# Patient Record
Sex: MALE | Race: WHITE
[De-identification: names, ages, dates, MRNs, and addresses within clinical notes are randomized per-mention and may not be internally consistent; named-entity substitution may affect disease eponyms.]

---

## 2020-12-04 ENCOUNTER — HOSPITAL ENCOUNTER (OUTPATIENT)
Age: 30
End: 2020-12-04
Payer: MEDICARE

## 2020-12-04 DIAGNOSIS — R53.83: Primary | ICD-10-CM

## 2020-12-04 DIAGNOSIS — R63.1: ICD-10-CM

## 2020-12-04 LAB
ALANINE AMINOTRANSFER ALT/SGPT: 32 U/L (ref 16–61)
ALBUMIN SERPL-MCNC: 4 G/DL (ref 3.2–5)
ALKALINE PHOSPHATASE: 87 U/L (ref 45–117)
ANION GAP: 6 (ref 5–15)
AST(SGOT): 18 U/L (ref 15–37)
BUN SERPL-MCNC: 9 MG/DL (ref 7–18)
BUN/CREAT RATIO: 11 RATIO (ref 10–20)
CALCIUM SERPL-MCNC: 9.2 MG/DL (ref 8.5–10.1)
CARBON DIOXIDE: 28 MMOL/L (ref 21–32)
CHLORIDE: 105 MMOL/L (ref 98–107)
DEPRECATED RDW RBC: 41.4 FL (ref 35.1–43.9)
ERYTHROCYTE [DISTWIDTH] IN BLOOD: 12.5 % (ref 11.6–14.6)
EST GLOM FILT RATE - AFR AMER: 144 ML/MIN (ref 60–?)
GLOBULIN: 4 G/DL (ref 2.2–4.2)
GLUCOSE: 82 MG/DL (ref 74–106)
HCT VFR BLD AUTO: 47.8 % (ref 40–54)
HEMOGLOBIN: 15.7 G/DL (ref 13–16.5)
HGB BLD-MCNC: 15.7 G/DL (ref 13–16.5)
IMMATURE GRANULOCYTES COUNT: 0.03 X10^3/UL (ref 0–0)
MCV RBC: 89.5 FL (ref 80–94)
MEAN CORP HGB CONC: 32.8 G/DL (ref 32–36)
MEAN PLATELET VOL.: 9.6 FL (ref 6.2–12)
NRBC FLAGGED BY ANALYZER: 0 % (ref 0–5)
PLATELET # BLD: 230 K/MM3 (ref 150–450)
PLATELET COUNT: 230 K/MM3 (ref 150–450)
POTASSIUM: 3.8 MMOL/L (ref 3.5–5.1)
RBC # BLD AUTO: 5.34 M/MM3 (ref 4.6–6.2)
RBC DISTRIBUTION WIDTH CV: 12.5 % (ref 11.6–14.6)
RBC DISTRIBUTION WIDTH SD: 41.4 FL (ref 35.1–43.9)
T3 SERPL IA-MCNC: 1.03 NG/ML (ref 0.6–1.81)
T4 SERPL-MCNC: 7.1 UG/DL (ref 4.5–12.1)
WBC # BLD AUTO: 7.8 K/MM3 (ref 4.4–11)
WHITE BLOOD COUNT: 7.8 K/MM3 (ref 4.4–11)

## 2020-12-04 PROCEDURE — 84480 ASSAY TRIIODOTHYRONINE (T3): CPT

## 2020-12-04 PROCEDURE — 84443 ASSAY THYROID STIM HORMONE: CPT

## 2020-12-04 PROCEDURE — 85025 COMPLETE CBC W/AUTO DIFF WBC: CPT

## 2020-12-04 PROCEDURE — 36415 COLL VENOUS BLD VENIPUNCTURE: CPT

## 2020-12-04 PROCEDURE — 84436 ASSAY OF TOTAL THYROXINE: CPT

## 2020-12-04 PROCEDURE — 80053 COMPREHEN METABOLIC PANEL: CPT

## 2023-11-30 ENCOUNTER — APPOINTMENT (OUTPATIENT)
Dept: DERMATOLOGY | Facility: CLINIC | Age: 33
End: 2023-11-30
Payer: MEDICARE

## 2023-12-13 ENCOUNTER — TELEMEDICINE (OUTPATIENT)
Dept: BEHAVIORAL HEALTH | Facility: CLINIC | Age: 33
End: 2023-12-13
Payer: MEDICARE

## 2023-12-13 DIAGNOSIS — F41.1 GAD (GENERALIZED ANXIETY DISORDER): ICD-10-CM

## 2023-12-13 DIAGNOSIS — F32.A CHRONIC DEPRESSION: ICD-10-CM

## 2023-12-13 DIAGNOSIS — F42.9 OBSESSIVE-COMPULSIVE DISORDER, UNSPECIFIED TYPE: ICD-10-CM

## 2023-12-13 PROCEDURE — 99214 OFFICE O/P EST MOD 30 MIN: CPT | Performed by: CLINICAL NURSE SPECIALIST

## 2023-12-13 RX ORDER — SERTRALINE HYDROCHLORIDE 100 MG/1
150 TABLET, FILM COATED ORAL DAILY
COMMUNITY
End: 2023-12-13 | Stop reason: SDUPTHER

## 2023-12-13 RX ORDER — BUPROPION HYDROCHLORIDE 150 MG/1
150 TABLET ORAL
Qty: 90 TABLET | Refills: 0 | Status: SHIPPED | OUTPATIENT
Start: 2023-12-13 | End: 2024-02-01 | Stop reason: SDUPTHER

## 2023-12-13 RX ORDER — BUPROPION HYDROCHLORIDE 150 MG/1
150 TABLET ORAL
COMMUNITY
Start: 2023-05-18 | End: 2023-12-13 | Stop reason: SDUPTHER

## 2023-12-13 RX ORDER — SERTRALINE HYDROCHLORIDE 100 MG/1
150 TABLET, FILM COATED ORAL DAILY
Qty: 135 TABLET | Refills: 0 | Status: SHIPPED | OUTPATIENT
Start: 2023-12-13 | End: 2024-02-01 | Stop reason: SDUPTHER

## 2023-12-13 NOTE — PROGRESS NOTES
Outpatient Psychiatry      Subjective   Dante Jones, a 33 y.o. male, presents for follow up for medication appointment /outpatient psychiatry appointment as a virtual appointment , he is an established patient   He did the appointment with his mother with his preference for this , whom helps with his care with managing appointments , and he makes his own medical decisions , and she appears supportive     Diagnosis:          ANISH (generalized anxiety disorder) (300.02) (F41.1)   · Chronic depression (311) (F32.A)   · OCD (obsessive compulsive disorder) (300.3) (F42.9)    Treatment Goals:  Specify outcomes written in observable, behavioral terms:   Anxiety: reducing negative automatic thoughts and reducing physical symptoms of anxiety  Depression: increasing energy, increasing motivation, reducing fatigue, and reducing negative automatic thoughts    Treatment Plan/Recommendations: can call  for treatment concerns.can continue to work on self care and wellness efforts and diversion activities and continue therapy as planned. can continue routine health screenings. can follow up for meds in 10-12 weeks   Follow-up plan was discussed with patient.    Review with patient: Treatment plan reviewed with the patient.  Medication risks/benefit reviewed with the patient    HPI:  encouraged maintaining a schedule daily with diversion activities and exercise , self care and maintaining a regular sleep pattern   he has continued to see his therapist regularly   intends to continue therapy   discussed the process for seeking emergency treatment for mental health crisis situations   reconciled medication list in the [facility] emr and provided psychoeducation   support and psychoeducation provided       he has gone to group therapy      He is active with his [employer]       He says he has gained weight , says he gained about 25 pounds      Says he had another sleep apnea test last year and says in April he had a sleep study and  "it is \" not significant \" he had a cpap at one point     and he was not able to use this       He saw a functional medicine dr at [facility] and and he feels validated that his health concerns are being addressed there       He has not had any self injury since previous appointment      He has intrusive thoughts at time of negative self talk  , denies having any suicidal thoughts     He is taking sertraline in the evening     Review of systems :    Depressive Symptoms: chronic depressed /irritable mood, fatigue, poor concentration, but no loss of interest, no change in appetite, no recent says in 2014 he was 240 and now is at 260 pounds , craves chocolate lb weight gain, no recent lb weight loss, no insomnia, not feeling worthless or guilty, ability to make decisions, no suicidal ideation, no guns or weapons in household.   Manic Symptoms: mood is not irritable or elevated, self esteem is not grandiose or increased, no changes in need for sleep, not more talkative than usual, does not have flight of ideas or racing thoughts, no distractibility, no psychomotor agitation or increased goal-directed activity, no excessive involvement in pleasurable activities.   Psychotic Symptoms: no hallucinations, no delusions, no disorganized speech, does not have disorganized behavior or catatonia, no negative symptoms.   Anxiety Symptoms: difficulty controlling worry, increased arousal, obsessions (recurrent thoughts, impulses, or images), compulsions , exposure to traumatic event, but no panic attacks, no concerns about future panic attacks, no worry about panic attack consequences, no change in behavior due to panic attacks, no excessive worry, not easily fatigued due to worry, no difficulty concentrating due to worry, no irritability due to worry, no muscle tension due to worry, no sleep disturbances due to worry, no specific phobia, no social phobia, no re-experiencing of traumatic event, no avoidance of stimuli and " number of responsiveness, no restlessness / feeling on edge due to worry . ruminates , in ocd like manner on symptoms , then looks up side effects and medications , also engages his mother in looking up these things.   Delirium/ Altered Mental Status Symptoms: no disturbances of consciousness, no diminished ability to focus, sustain, shift attention, no change in cognition or perceptual disturbances, symptoms do not fluctuate during the course of the day, general medical condition is not present.   Disordered Eating Symptoms: weight is not less than 85% of ideal body weight, no intense fear of gaining weight, does not have a poor body image, no restricting of diet and/or excessive exercise, no purging or laxative use.   Post-traumatic stress disorder symptoms The patient is currently asymptomatic.   Inattentive Symptoms: often has difficulty paying attention, is often disorganized, is often easily distracted, often avoids/dislikes tasks with sustained mental effort, but does not make careless mistakes often, does not lose things often, is not often forgetful, listens when spoken to directly, is able to follow instructions and finish schoolwork.   Conduct Issues: no aggression towards people or animals, no destruction of property, no deceitfulness, does not violate rules.   Other Symptoms/ Concerns: no symptoms of separation anxiety, no reactive attachment symptoms, no motor tics, no vocal tics, no stuttering, no phonological problems, no loss of urine control, no encopresis, no intellectual disability, no self-injurious behaviors, not somatic and no conversion symptoms, no gender identity symptoms, no sleep disorder symptoms, no impulse control symptoms, no personality disorder symptoms.       Constitutional: as noted in HPI, no sleep apnea, normal sleeping, no night wakings, no snoring, not a picky eater, normal appetite, no swallowing problems, no night terrors, no nightmares, no restless sleep, no snorts/gasps  and no obesity.   Eyes: no vision test, no vision impairment, does not wear glasses/contacts, does not wear glassess/contacts and no blindness.   ENT: no hearing tested, no hearing loss, no hearing aid, no cochlear implant, no excessive drooling, no dental problems and no recurrent strep throat.   Cardiovascular: no murmur, no heart defect, no chest pain, no palpitations and no syncope.   Respiratory: no wheezing and no asthma/reactive airway disease.   Gastrointestinal: no constipation, no abdominal pain, no nausea, no vomiting, no diarrhea, no blood in stools, no g-tube and no reflux.   Genitourinary: no nocturnal enuresis, no diurnal enuresis and no incontinence.   Musculoskeletal: normal gait and no torticollis, but moving all extremities well and symmetrical, no arthritis or joint problems, no myalgias, no muscle weakness and normal hand preference.   Integumentary: no changes in moles or birthmarks, no rashes and no atopic dermatitis.   Neurological: no symmetrical facies, no headache, no head injury, no seizures, no staring spells, no loss of consciousness, no meningitis/encephalitis, no cerebral palsy, no spina bifida, no stereotypy, no developmental regression and no tics or twitches.   Endocrine: no temperature intolerance, , good growth and no failure to thrive.   Hematologic/Lymphatic: no anemia and no lead poisoning.        Current Medications:  No current outpatient medications on file.  Medical History:  No past medical history on file.  Surgical History:  No past surgical history on file.  Family History:  No family history on file.  Social History:  Social History     Socioeconomic History    Marital status: Single     Spouse name: Not on file    Number of children: Not on file    Years of education: Not on file    Highest education level: Not on file   Occupational History    Not on file   Tobacco Use    Smoking status: Not on file    Smokeless tobacco: Not on file   Substance and Sexual Activity     Alcohol use: Not on file    Drug use: Not on file    Sexual activity: Not on file   Other Topics Concern    Not on file   Social History Narrative    Not on file     Social Determinants of Health     Financial Resource Strain: Not on file   Food Insecurity: Not on file   Transportation Needs: Not on file   Physical Activity: Not on file   Stress: Not on file   Social Connections: Not on file   Intimate Partner Violence: Not on file   Housing Stability: Not on file     Record Review: brief     Vitals:  There were no vitals filed for this visit.    Laura Byers, APRN-CNS

## 2024-02-01 ENCOUNTER — TELEPHONE (OUTPATIENT)
Dept: BEHAVIORAL HEALTH | Facility: CLINIC | Age: 34
End: 2024-02-01

## 2024-02-01 ENCOUNTER — TELEMEDICINE (OUTPATIENT)
Dept: BEHAVIORAL HEALTH | Facility: CLINIC | Age: 34
End: 2024-02-01
Payer: MEDICARE

## 2024-02-01 DIAGNOSIS — F32.A CHRONIC DEPRESSION: ICD-10-CM

## 2024-02-01 DIAGNOSIS — F41.1 GAD (GENERALIZED ANXIETY DISORDER): ICD-10-CM

## 2024-02-01 DIAGNOSIS — F42.9 OBSESSIVE-COMPULSIVE DISORDER, UNSPECIFIED TYPE: ICD-10-CM

## 2024-02-01 PROCEDURE — 99214 OFFICE O/P EST MOD 30 MIN: CPT | Performed by: CLINICAL NURSE SPECIALIST

## 2024-02-01 RX ORDER — BUPROPION HYDROCHLORIDE 150 MG/1
150 TABLET ORAL
Qty: 90 TABLET | Refills: 0 | Status: SHIPPED | OUTPATIENT
Start: 2024-02-01 | End: 2024-04-03 | Stop reason: SDUPTHER

## 2024-02-01 RX ORDER — SERTRALINE HYDROCHLORIDE 100 MG/1
150 TABLET, FILM COATED ORAL DAILY
Qty: 135 TABLET | Refills: 0 | Status: SHIPPED | OUTPATIENT
Start: 2024-02-01 | End: 2024-04-03 | Stop reason: SDUPTHER

## 2024-02-01 NOTE — PROGRESS NOTES
"Outpatient Psychiatry      Subjective   Dante Jones, a 34 y.o. male, presents for follow up for medication appointment /outpatient psychiatry appointment as a virtual appointment , he is an established patient   He did the appointment with his mother with his preference for this , whom helps with his care with managing appointments , and he makes his own medical decisions , and she appears supportive        Diagnosis: ANISH (generalized anxiety disorder) (300.02) (F41.1)   · Chronic depression (311) (F32.A)   · OCD (obsessive compulsive disorder) (300.3) (F42.9)    Treatment Plan/Recommendations: can call  for treatment concerns.can continue to work on self care and wellness efforts and diversion activities and continue therapy as planned. can continue routine health screenings. can follow up for meds in 10-12 weeks    Follow-up plan was discussed with patient.    Review with patient: Treatment plan reviewed with the patient.  Medication risks/benefit reviewed with the patient    HPI:  encouraged maintaining a schedule daily with diversion activities and exercise , self care and maintaining a regular sleep pattern   he has continued to see his therapist regularly   intends to continue therapy   discussed the process for seeking emergency treatment for mental health crisis situations   reconciled medication list in the [facility] emr and provided psychoeducation   support and psychoeducation provided       he has gone to group therapy    Says he had another sleep apnea test last year and says in April he had a sleep study and it is \" not significant \" he had a cpap at one point     and he was not able to use this       He saw a functional medicine dr at [facility] and and he feels validated that his health concerns are being addressed there       He has not had any self injury recently      He has intrusive thoughts at time of negative self talk  , denies having any suicidal thoughts     He is taking " sertraline in the evening      Current Outpatient Medications:     buPROPion XL (Wellbutrin XL) 150 mg 24 hr tablet, Take 1 tablet (150 mg) by mouth once daily in the morning. Take before meals., Disp: 90 tablet, Rfl: 0    sertraline (Zoloft) 100 mg tablet, Take 1.5 tablets (150 mg) by mouth once daily., Disp: 135 tablet, Rfl: 0  Medical History:  No past medical history on file.  Surgical History:  No past surgical history on file.  Family History:  No family history on file.  Social History:  Social History     Socioeconomic History    Marital status: Single     Spouse name: Not on file    Number of children: Not on file    Years of education: Not on file    Highest education level: Not on file   Occupational History    Not on file   Tobacco Use    Smoking status: Not on file    Smokeless tobacco: Not on file   Substance and Sexual Activity    Alcohol use: Not on file    Drug use: Not on file    Sexual activity: Not on file   Other Topics Concern    Not on file   Social History Narrative    Not on file     Social Determinants of Health     Financial Resource Strain: Not on file   Food Insecurity: Not on file   Transportation Needs: Not on file   Physical Activity: Not on file   Stress: Not on file   Social Connections: Not on file   Intimate Partner Violence: Not on file   Housing Stability: Not on file     Record Review: brief     Vitals:  There were no vitals filed for this visit.    Laura Byers, STU-CNS

## 2024-04-03 ENCOUNTER — TELEMEDICINE (OUTPATIENT)
Dept: BEHAVIORAL HEALTH | Facility: CLINIC | Age: 34
End: 2024-04-03
Payer: MEDICARE

## 2024-04-03 DIAGNOSIS — F41.1 GAD (GENERALIZED ANXIETY DISORDER): ICD-10-CM

## 2024-04-03 DIAGNOSIS — F42.9 OBSESSIVE-COMPULSIVE DISORDER, UNSPECIFIED TYPE: ICD-10-CM

## 2024-04-03 DIAGNOSIS — F32.A CHRONIC DEPRESSION: ICD-10-CM

## 2024-04-03 PROCEDURE — 99214 OFFICE O/P EST MOD 30 MIN: CPT | Performed by: CLINICAL NURSE SPECIALIST

## 2024-04-03 RX ORDER — BUPROPION HYDROCHLORIDE 150 MG/1
150 TABLET ORAL
Qty: 90 TABLET | Refills: 0 | Status: SHIPPED | OUTPATIENT
Start: 2024-04-03 | End: 2024-07-02

## 2024-04-03 RX ORDER — SUMATRIPTAN 50 MG/1
TABLET, FILM COATED ORAL
COMMUNITY
Start: 2022-05-27

## 2024-04-03 RX ORDER — PREDNISOLONE ACETATE 10 MG/ML
SUSPENSION/ DROPS OPHTHALMIC
COMMUNITY
Start: 2022-10-18

## 2024-04-03 RX ORDER — MULTIVITAMIN
1 TABLET ORAL
COMMUNITY

## 2024-04-03 RX ORDER — MULTIVIT-MIN/FERROUS FUMARATE 15 MG
TABLET ORAL
COMMUNITY
Start: 2020-05-14

## 2024-04-03 RX ORDER — CICLOPIROX 80 MG/ML
SOLUTION TOPICAL
COMMUNITY
Start: 2024-03-12

## 2024-04-03 RX ORDER — SERTRALINE HYDROCHLORIDE 100 MG/1
150 TABLET, FILM COATED ORAL DAILY
Qty: 135 TABLET | Refills: 0 | Status: SHIPPED | OUTPATIENT
Start: 2024-04-03 | End: 2024-07-02

## 2024-04-03 RX ORDER — LEVOCETIRIZINE DIHYDROCHLORIDE 5 MG/1
5 TABLET, FILM COATED ORAL
COMMUNITY
Start: 2024-01-16

## 2024-04-03 NOTE — PROGRESS NOTES
"Outpatient Psychiatry      Subjective     Dante Jones, a 34 y.o. male,presents for follow up for medication appointment /outpatient psychiatry appointment as a virtual appointment , he is an established patient   He did the appointment with his mother with his preference for this , whom helps with his care with managing appointments , and he makes his own medical decisions , and she appears supportive         Diagnosis: ANISH (generalized anxiety disorder) (300.02) (F41.1)   · Chronic depression (311) (F32.A)   · OCD (obsessive compulsive disorder) (300.3) (F42.9)    Treatment Plan/Recommendations:   Follow-up plan was discussed with patient.can follow up in 10-12 weeks , can call  for treatment and scheduling concerns         Review with patient: Treatment plan reviewed with the patient.  Medication risks/benefit reviewed with the patient      HPI:  Sleeps through the night , falls asleep in 5 minutes to a half an hour or more, he does not have any chronic issues with sleep   he has continued to see his psychologist regularly   intends to continue therapy   discussed the process for seeking emergency treatment for mental health crisis situations   reconciled medication list in the [facility] emr and provided psychoeducation   support and psychoeducation provided       he has gone to group therapy    Says he had another sleep apnea test last year and says in April he had a sleep study and it is \" not significant \" he had a cpap at one point and he was not able to use this       He saw a functional medicine dr at [facility] and and he feels validated that his health concerns are being addressed there       He has not had any self injury recently      He has intrusive thoughts at time of negative self talk  , denies having any suicidal thoughts     He is taking sertraline in the evening, he sees the sertraline as helpful for intrusive thoughts  Says sometimes he feels tired \" when I get brain fog \" which " "he says is relived when he moves his right shoulder , he was focused on talking about what he sees as a connection with this   His mother says \" when the brain fog is under control he is better \"      Current Outpatient Medications:     buPROPion XL (Wellbutrin XL) 150 mg 24 hr tablet, Take 1 tablet (150 mg) by mouth once daily in the morning. Take before meals., Disp: 90 tablet, Rfl: 0    sertraline (Zoloft) 100 mg tablet, Take 1.5 tablets (150 mg) by mouth once daily., Disp: 135 tablet, Rfl: 0  Medical History:  No past medical history on file.  Surgical History:  No past surgical history on file.  Family History:  No family history on file.  Social History:  Social History     Socioeconomic History    Marital status: Single     Spouse name: Not on file    Number of children: Not on file    Years of education: Not on file    Highest education level: Not on file   Occupational History    Not on file   Tobacco Use    Smoking status: Not on file    Smokeless tobacco: Not on file   Substance and Sexual Activity    Alcohol use: Not on file    Drug use: Not on file    Sexual activity: Not on file   Other Topics Concern    Not on file   Social History Narrative    Not on file     Social Determinants of Health     Financial Resource Strain: Not on file   Food Insecurity: Not on file   Transportation Needs: Not on file   Physical Activity: Not on file   Stress: Not on file   Social Connections: Not on file   Intimate Partner Violence: Not on file   Housing Stability: Not on file     Record Review: brief     Vitals:  There were no vitals filed for this visit.    Laura Byers, APRN-CNS    "

## 2024-07-18 DIAGNOSIS — F42.9 OBSESSIVE-COMPULSIVE DISORDER, UNSPECIFIED TYPE: ICD-10-CM

## 2024-07-18 DIAGNOSIS — F41.1 GAD (GENERALIZED ANXIETY DISORDER): ICD-10-CM

## 2024-07-18 DIAGNOSIS — F32.A CHRONIC DEPRESSION: ICD-10-CM

## 2024-07-18 RX ORDER — BUPROPION HYDROCHLORIDE 150 MG/1
150 TABLET ORAL
Qty: 90 TABLET | Refills: 0 | Status: SHIPPED | OUTPATIENT
Start: 2024-07-18 | End: 2024-10-16

## 2024-07-18 RX ORDER — SERTRALINE HYDROCHLORIDE 100 MG/1
150 TABLET, FILM COATED ORAL DAILY
Qty: 135 TABLET | Refills: 0 | Status: SHIPPED | OUTPATIENT
Start: 2024-07-18 | End: 2024-10-16

## 2024-07-31 ENCOUNTER — APPOINTMENT (OUTPATIENT)
Dept: BEHAVIORAL HEALTH | Facility: CLINIC | Age: 34
End: 2024-07-31
Payer: MEDICARE

## 2024-07-31 DIAGNOSIS — F41.1 GAD (GENERALIZED ANXIETY DISORDER): ICD-10-CM

## 2024-07-31 DIAGNOSIS — F42.9 OBSESSIVE-COMPULSIVE DISORDER, UNSPECIFIED TYPE: ICD-10-CM

## 2024-07-31 DIAGNOSIS — F32.A CHRONIC DEPRESSION: ICD-10-CM

## 2024-07-31 PROCEDURE — 99213 OFFICE O/P EST LOW 20 MIN: CPT | Performed by: CLINICAL NURSE SPECIALIST

## 2024-07-31 RX ORDER — BUPROPION HYDROCHLORIDE 150 MG/1
150 TABLET ORAL
Qty: 90 TABLET | Refills: 0 | Status: SHIPPED | OUTPATIENT
Start: 2024-07-31 | End: 2024-10-29

## 2024-07-31 RX ORDER — SERTRALINE HYDROCHLORIDE 100 MG/1
150 TABLET, FILM COATED ORAL DAILY
Qty: 135 TABLET | Refills: 0 | Status: SHIPPED | OUTPATIENT
Start: 2024-07-31 | End: 2024-10-29

## 2024-07-31 NOTE — PROGRESS NOTES
Outpatient Psychiatry      Subjective   Dante Jones, a 34 y.o. male, presents for follow up for medication appointment /outpatient psychiatry appointment for an audio visual appointment , he is an established patient   He did the appointment with his mother present with his preference for this , whom helps with his care with managing appointments , and he makes his own medical decisions , and she appears supportive , he was at home for this appointment and verbally consented to the appointment         Diagnosis: ANISH (generalized anxiety disorder) (300.02) (F41.1) mild    · Chronic depression (311) (F32.A) mild    · OCD (obsessive compulsive disorder) unspecified type mild     Treatment Plan/Recommendations:   Follow-up plan was discussed with patient.  Psychotropic medications :  Continue bupropion xl 150 mg , daily for depression   Continue sertraline 100 mg , 1 and 1/2 tablets daily for depression and anxiety     Review with patient: Treatment plan reviewed with the patient.  Medication risks/benefit reviewed with the patient    HPI:  he has continued to see his psychologist regularly for individual and also has group therapy   intends to continue therapy   No recent self harm issues, no recent er visits for mental health reasons , no recent psychiatric hospitalizations   reconciled medication list in the [facility] emr and provided psychoeducation and   support     he had a cpap at one point and he was not able to use this , he says his sinuses were an issue with sleep , he is working on losing weight , went to a nutrition class last night at [facility] and this was helpful       He saw a functional medicine dr at [facility] and and he feels validated that his health concerns are being addressed there , is taking naltrexone compounded formulation and says his inflammation has gone down       He is pleased with his current psychotropic medication doses      Current Outpatient Medications:     ASPIRIN ORAL,  Take by mouth., Disp: , Rfl:     buPROPion XL (Wellbutrin XL) 150 mg 24 hr tablet, Take 1 tablet (150 mg) by mouth once daily in the morning. Take before meals., Disp: 90 tablet, Rfl: 0    ciclopirox (Penlac) 8 % solution, Apply topically., Disp: , Rfl:     L-GLUTAMINE ORAL, Take 0.5 tsp by mouth twice a day., Disp: , Rfl:     levocetirizine (Xyzal) 5 mg tablet, Take 1 tablet (5 mg) by mouth once daily., Disp: , Rfl:     multivit-min-ferrous fumarate 15 mg iron tablet, Take by mouth., Disp: , Rfl:     multivitamin tablet, Take 1 tablet by mouth once daily., Disp: , Rfl:     OMEGA-3S-DHA-EPA-FISH OIL ORAL, Take by mouth once daily., Disp: , Rfl:     prednisoLONE acetate (Pred-Forte) 1 % ophthalmic suspension, Administer into affected eye(s)., Disp: , Rfl:     sertraline (Zoloft) 100 mg tablet, Take 1.5 tablets (150 mg) by mouth once daily., Disp: 135 tablet, Rfl: 0    SUMAtriptan (Imitrex) 50 mg tablet, Take by mouth., Disp: , Rfl:   Medical History:  No past medical history on file.  Surgical History:  No past surgical history on file.  Family History:  No family history on file.  Social History:  Social History     Socioeconomic History    Marital status: Single     Spouse name: Not on file    Number of children: Not on file    Years of education: Not on file    Highest education level: Not on file   Occupational History    Not on file   Tobacco Use    Smoking status: Not on file    Smokeless tobacco: Not on file   Substance and Sexual Activity    Alcohol use: Not on file    Drug use: Not on file    Sexual activity: Not on file   Other Topics Concern    Not on file   Social History Narrative    Not on file     Social Determinants of Health     Financial Resource Strain: Low Risk  (9/19/2022)    Received from [facility]    Overall Financial Resource Strain (CARDIA)     Difficulty of Paying Living Expenses: Not hard at all   Food Insecurity: No Food Insecurity  (9/19/2022)    Received from [email or web address]    Hunger Vital Sign     Worried About Running Out of Food in the Last Year: Never true     Ran Out of Food in the Last Year: Never true   Transportation Needs: No Transportation Needs (4/22/2020)    Received from [facility]    PRAPARE - Transportation     Lack of Transportation (Medical): No     Lack of Transportation (Non-Medical): No   Physical Activity: Insufficiently Active (4/22/2020)    Received from [email or web address]    Exercise Vital Sign     Days of Exercise per Week: 3 days     Minutes of Exercise per Session: 10 min   Stress: Stress Concern Present (4/22/2020)    Received from [facility]    [facility] - Occupational Stress Questionnaire     Feeling of Stress : Very much   Social Connections: Unknown (4/22/2020)    Received from [email or web address]    Social Connection and Isolation Panel [NHANES]     Frequency of Communication with Friends and Family: More than three times a week     Frequency of Social Gatherings with Friends and Family: More than three times a week     Attends Shinto Services: More than 4 times per year     Active Member of Clubs or Organizations: No     Attends Club or Organization Meetings: Never     Marital Status: Not on file   Intimate Partner Violence: Not on file   Housing Stability: Not on file     Record Review: brief    Vitals:  There were no vitals filed for this visit.    Laura Byers, APRN-CNS

## 2024-09-13 ENCOUNTER — APPOINTMENT (OUTPATIENT)
Dept: BEHAVIORAL HEALTH | Facility: CLINIC | Age: 34
End: 2024-09-13
Payer: MEDICARE

## 2024-09-26 ENCOUNTER — APPOINTMENT (OUTPATIENT)
Dept: BEHAVIORAL HEALTH | Facility: CLINIC | Age: 34
End: 2024-09-26
Payer: MEDICARE

## 2024-09-26 DIAGNOSIS — F32.A CHRONIC DEPRESSION: ICD-10-CM

## 2024-09-26 DIAGNOSIS — F42.9 OBSESSIVE-COMPULSIVE DISORDER, UNSPECIFIED TYPE: ICD-10-CM

## 2024-09-26 DIAGNOSIS — F41.1 GAD (GENERALIZED ANXIETY DISORDER): ICD-10-CM

## 2024-09-26 PROBLEM — F90.0 ATTENTION DEFICIT HYPERACTIVITY DISORDER (ADHD), PREDOMINANTLY INATTENTIVE TYPE: Status: ACTIVE | Noted: 2023-01-20

## 2024-09-26 PROBLEM — F41.9 ANXIETY DISORDER: Status: ACTIVE | Noted: 2024-09-26

## 2024-09-26 PROCEDURE — 99213 OFFICE O/P EST LOW 20 MIN: CPT | Performed by: CLINICAL NURSE SPECIALIST

## 2024-09-26 RX ORDER — BUPROPION HYDROCHLORIDE 150 MG/1
150 TABLET ORAL
Qty: 90 TABLET | Refills: 0 | Status: SHIPPED | OUTPATIENT
Start: 2024-09-26 | End: 2024-12-25

## 2024-09-26 RX ORDER — SERTRALINE HYDROCHLORIDE 100 MG/1
150 TABLET, FILM COATED ORAL DAILY
Qty: 135 TABLET | Refills: 0 | Status: SHIPPED | OUTPATIENT
Start: 2024-09-26 | End: 2024-12-25

## 2024-09-26 NOTE — PROGRESS NOTES
Outpatient Psychiatry      Subjective   Dante Jones, a 34 y.o. male, presents for follow up for medication appointment /outpatient psychiatry appointment for an audio and video virtual appointment , he is an established patient   He did the appointment with his mother present with his preference for this , whom helps with his care with managing appointments , and he makes his own medical decisions , and she appears supportive , he was at home for this appointment and verbally consented to the appointment   Virtual or Telephone Consent    An interactive audio and video telecommunication system which permits real time communications between the patient (at the originating site) and provider (at the distant site) was utilized to provide this telehealth service.   Verbal consent was requested and obtained from Dante Jones on this date, 10/01/24 for a telehealth visit.          Diagnosis: ANISH (generalized anxiety disorder)  (F41.1) mild    · Chronic depression (F32.A) mild    · OCD (obsessive compulsive disorder) unspecified type mild F42.9     Treatment Plan/Recommendations:   Follow-up plan was discussed with patient.  Psychotropic medications :  Continue bupropion xl 150 mg , daily for depression   Continue sertraline 100 mg , 1 and 1/2 tablets daily for depression and anxiety      Review with patient: Treatment plan reviewed with the patient.  Medication risks/benefit reviewed with the patient     HPI:  he has continued to see his psychologist regularly for individual and also has group therapy   intends to continue therapy   No recent self harm issues, no recent er visits for mental health reasons , no recent psychiatric hospitalizations   reconciled medication list in the [facility] emr and provided psychoeducation and   support     he had a cpap at one point and he was not able to use this , he says his sinuses were an issue with sleep , he is working on losing weight , he lost 10 pounds with diet modification       "He sees a functional medicine dr at [facility] every few months , and and he feels validated that his health concerns are being addressed there , is taking naltrexone compounded formulation and says his inflammation has gone down overall       He is pleased with his current psychotropic medication doses   He is concerned about his attention and focus   He says ocd is affecting him a little bit , he explains he is able to push through \"obsessive thoughts loops\" he explains this is for certain activities , and his mind gets into intrusive thoughts with thinking he needs to do things with certain activities     Psych ros and medical ros as noted above      Current Outpatient Medications:     ASPIRIN ORAL, Take by mouth., Disp: , Rfl:     buPROPion XL (Wellbutrin XL) 150 mg 24 hr tablet, Take 1 tablet (150 mg) by mouth once daily in the morning. Take before meals., Disp: 90 tablet, Rfl: 0    ciclopirox (Penlac) 8 % solution, Apply topically., Disp: , Rfl:     L-GLUTAMINE ORAL, Take 0.5 tsp by mouth twice a day., Disp: , Rfl:     levocetirizine (Xyzal) 5 mg tablet, Take 1 tablet (5 mg) by mouth once daily., Disp: , Rfl:     multivit-min-ferrous fumarate 15 mg iron tablet, Take by mouth., Disp: , Rfl:     multivitamin tablet, Take 1 tablet by mouth once daily., Disp: , Rfl:     OMEGA-3S-DHA-EPA-FISH OIL ORAL, Take by mouth once daily., Disp: , Rfl:     prednisoLONE acetate (Pred-Forte) 1 % ophthalmic suspension, Administer into affected eye(s)., Disp: , Rfl:     sertraline (Zoloft) 100 mg tablet, Take 1.5 tablets (150 mg) by mouth once daily., Disp: 135 tablet, Rfl: 0    SUMAtriptan (Imitrex) 50 mg tablet, Take by mouth., Disp: , Rfl:   Medical History:  No past medical history on file.  Surgical History:  No past surgical history on file.  Family History:  No family history on file.  Social History:  Social History     Socioeconomic History    Marital status: Single     Spouse name: Not on file    Number of " children: Not on file    Years of education: Not on file    Highest education level: Not on file   Occupational History    Not on file   Tobacco Use    Smoking status: Not on file    Smokeless tobacco: Not on file   Substance and Sexual Activity    Alcohol use: Not on file    Drug use: Not on file    Sexual activity: Not on file   Other Topics Concern    Not on file   Social History Narrative    Not on file     Social Determinants of Health     Financial Resource Strain: Low Risk  (9/19/2022)    [facility] from [facility]    Overall Financial Resource Strain (CARDIA)     Difficulty of Paying Living Expenses: Not hard at all   Food Insecurity: No Food Insecurity (9/19/2022)    Received from [facility]    Hunger Vital Sign     Worried About Running Out of Food in the Last Year: Never true     Ran Out of Food in the Last Year: Never true   Transportation Needs: No Transportation Needs (4/22/2020)    Received from [facility]    PRAPARE - Transportation     Lack of Transportation (Medical): No     Lack of Transportation (Non-Medical): No   Physical Activity: Insufficiently Active (4/22/2020)    [facility]    Exercise Vital Sign     Days of Exercise per Week: 3 days     Minutes of Exercise per Session: 10 min   Stress: Stress Concern Present (4/22/2020)    Received from [facility]    [place] of Occupational Health - Occupational Stress Questionnaire     Feeling of Stress : Very much   Social Connections: Unknown (4/22/2020)    Received from [facility]    Social Connection and Isolation Panel [NHANES]     Frequency of Communication with Friends and Family:  More than three times a week     Frequency of Social Gatherings with Friends and Family: More than three times a week     Attends Hoahaoism Services: More than 4 times per year     Active Member of Clubs or Organizations: No     Attends Club or Organization Meetings: Never     Marital Status: Not on file   Intimate Partner Violence: Not on file   Housing Stability: Not on file     Vitals:  There were no vitals filed for this visit.    Laura Byers, APRN-CNS

## 2024-11-26 ENCOUNTER — APPOINTMENT (OUTPATIENT)
Dept: BEHAVIORAL HEALTH | Facility: CLINIC | Age: 34
End: 2024-11-26
Payer: MEDICARE

## 2024-11-26 DIAGNOSIS — F32.A CHRONIC DEPRESSION: ICD-10-CM

## 2024-11-26 DIAGNOSIS — F42.9 OBSESSIVE-COMPULSIVE DISORDER, UNSPECIFIED TYPE: ICD-10-CM

## 2024-11-26 DIAGNOSIS — F41.1 GAD (GENERALIZED ANXIETY DISORDER): ICD-10-CM

## 2024-11-26 PROCEDURE — 99213 OFFICE O/P EST LOW 20 MIN: CPT | Performed by: CLINICAL NURSE SPECIALIST

## 2024-11-26 RX ORDER — BUPROPION HYDROCHLORIDE 150 MG/1
150 TABLET ORAL
Qty: 90 TABLET | Refills: 0 | Status: SHIPPED | OUTPATIENT
Start: 2024-11-26 | End: 2025-02-24

## 2024-11-26 RX ORDER — SERTRALINE HYDROCHLORIDE 100 MG/1
100 TABLET, FILM COATED ORAL DAILY
Qty: 90 TABLET | Refills: 0 | Status: SHIPPED | OUTPATIENT
Start: 2024-11-26 | End: 2025-02-24

## 2024-11-26 RX ORDER — SERTRALINE HYDROCHLORIDE 100 MG/1
100 TABLET, FILM COATED ORAL DAILY
Qty: 90 TABLET | Refills: 0 | Status: SHIPPED | OUTPATIENT
Start: 2024-11-26 | End: 2024-11-26

## 2024-11-26 NOTE — PROGRESS NOTES
Outpatient Psychiatry      Subjective   Dante Jones, a 34 y.o. male, presents for follow up for medication appointment /outpatient psychiatry appointment for an audio and video virtual appointment , he is an established patient   He did the appointment with his mother present with his preference for this , whom helps with his care with managing appointments , and he makes his own medical decisions , and she appears supportive , he was at home for this appointment     Virtual or Telephone Consent     An interactive audio and video telecommunication system which permits real time communications between the patient (at the originating site) and provider (at the distant site) was utilized to provide this telehealth service.   Verbal consent was requested and obtained from Dante Jones on this date, 11/26/24 for a telehealth visit.          Diagnosis: ANISH (generalized anxiety disorder)  (F41.1) mild    · Chronic depression (F32.A) mild    · OCD (obsessive compulsive disorder) unspecified type mild F42.9     Treatment Plan/Recommendations:   Follow-up plan was discussed with patient.  Psychotropic medications :  Continue bupropion xl 150 mg , daily for depression   Continue sertraline 100 mg , 1 and 1/2 tablets daily for depression and anxiety      Review with patient: Treatment plan reviewed with the patient.  Medication risks/benefit reviewed with the patient     HPI:  he has continued to see his psychologist regularly for individual and also has group therapy   intends to continue therapy   No recent self harm issues, no recent er visits for mental health reasons , no recent psychiatric hospitalizations   reconciled medication list in the [facility] emr and provided psychoeducation and   support     he had a cpap at one point and he was not able to use this , he says his sinuses were an issue with sleep , he is working on losing weight , he lost 10 pounds with diet modification      He sees a functional medicine dr at  [facility] every few months , and and he feels validated that his health concerns are being addressed there , is taking naltrexone compounded formulation and says his inflammation has gone down overall   He is concerned about his attention and focus, started taking lions jorge per functional medicine at [facility] for balance of neurotransmitters , such as dopamine , I instructed patient to discuss this more with him being already on bupropion xl , he had been more depressed when he was off bupropion xl and at this point depression is well managed comparatively      Psych ros and medical ros as noted above         Patient Active Problem List   Diagnosis    Attention deficit hyperactivity disorder (ADHD), predominantly inattentive type    Anxiety disorder    Obsessive-compulsive disorder    Chronic depression     Current Outpatient Medications:     ASPIRIN ORAL, Take by mouth., Disp: , Rfl:     buPROPion XL (Wellbutrin XL) 150 mg 24 hr tablet, Take 1 tablet (150 mg) by mouth once daily in the morning. Take before meals., Disp: 90 tablet, Rfl: 0    ciclopirox (Penlac) 8 % solution, Apply topically., Disp: , Rfl:     L-GLUTAMINE ORAL, Take 0.5 tsp by mouth twice a day., Disp: , Rfl:     levocetirizine (Xyzal) 5 mg tablet, Take 1 tablet (5 mg) by mouth once daily., Disp: , Rfl:     multivit-min-ferrous fumarate 15 mg iron tablet, Take by mouth., Disp: , Rfl:     multivitamin tablet, Take 1 tablet by mouth once daily., Disp: , Rfl:     naltrexone HCl (NALTREXONE ORAL), Take 4 mg by mouth once daily. Patient reports taking compounded naltrexone, Disp: , Rfl:     OMEGA-3S-DHA-EPA-FISH OIL ORAL, Take by mouth once daily., Disp: , Rfl:     prednisoLONE acetate (Pred-Forte) 1 % ophthalmic suspension, Administer into affected eye(s)., Disp: , Rfl:     sertraline (Zoloft) 100 mg tablet, Take 1.5 tablets (150 mg) by mouth once daily., Disp: 135 tablet, Rfl: 0  Medical History:  No past medical history on  file.  Surgical History:  No past surgical history on file.  Family History:  No family history on file.  Social History:  Social History     Socioeconomic History    Marital status: Single     Spouse name: Not on file    Number of children: Not on file    Years of education: Not on file    Highest education level: Not on file   Occupational History    Not on file   Tobacco Use    Smoking status: Never     Passive exposure: Past    Smokeless tobacco: Never    Tobacco comments:     Past exposure to smoke from a fireplace sensitivity  2013- 2022 reported by patient ; vents cleaned and uses an air filter    Substance and Sexual Activity    Alcohol use: Never    Drug use: Never    Sexual activity: Not on file   Other Topics Concern    Not on file   Social History Narrative    Not on file     Social Drivers of Health     Financial Resource Strain: Low Risk  (9/19/2022)    [facility]    Overall Financial Resource Strain (CARDIA)     Difficulty of Paying Living Expenses: Not hard at all   Food Insecurity: No Food Insecurity (9/19/2022)    [facility] [employer] [facility]    Hunger Vital Sign     Worried About Running Out of Food in the Last Year: Never true     Ran Out of Food in the Last Year: Never true   Transportation Needs: No Transportation Needs (4/22/2020)    [facility]    PRAPARE - Transportation     Lack of Transportation (Medical): No     Lack of Transportation (Non-Medical): No   Physical Activity: Insufficiently Active (4/22/2020)    [facility]    Exercise Vital Sign     Days of Exercise per Week: 3 days     Minutes of Exercise per Session: 10 min   Stress: Stress Concern Present (4/22/2020)    Received from [facility]  [email or web address]    [facility] [place] of Occupational Health - Occupational Stress Questionnaire     Feeling of Stress : Very much   Social Connections: Unknown (4/22/2020)    [email or web address]    Social Connection and Isolation Panel [NHANES]     Frequency of Communication with Friends and Family: More than three times a week     Frequency of Social Gatherings with Friends and Family: More than three times a week     Attends Baptism Services: More than 4 times per year     Active Member of Clubs or Organizations: No     Attends Club or Organization Meetings: Never     Marital Status: Not on file   Intimate Partner Violence: Not on file   Housing Stability: Not on file     Vitals:  There were no vitals filed for this visit.    Laura Byers, APRN-CNS

## 2025-01-21 ENCOUNTER — APPOINTMENT (OUTPATIENT)
Dept: BEHAVIORAL HEALTH | Facility: CLINIC | Age: 35
End: 2025-01-21
Payer: MEDICARE

## 2025-01-21 DIAGNOSIS — F32.A CHRONIC DEPRESSION: ICD-10-CM

## 2025-01-21 DIAGNOSIS — F42.9 OBSESSIVE-COMPULSIVE DISORDER, UNSPECIFIED TYPE: ICD-10-CM

## 2025-01-21 DIAGNOSIS — F41.1 GAD (GENERALIZED ANXIETY DISORDER): ICD-10-CM

## 2025-01-21 PROCEDURE — 99212 OFFICE O/P EST SF 10 MIN: CPT | Performed by: CLINICAL NURSE SPECIALIST

## 2025-01-21 RX ORDER — SERTRALINE HYDROCHLORIDE 100 MG/1
100 TABLET, FILM COATED ORAL DAILY
Qty: 90 TABLET | Refills: 1 | Status: SHIPPED | OUTPATIENT
Start: 2025-01-21 | End: 2025-04-21

## 2025-01-21 RX ORDER — BUPROPION HYDROCHLORIDE 150 MG/1
150 TABLET ORAL
Qty: 90 TABLET | Refills: 1 | Status: SHIPPED | OUTPATIENT
Start: 2025-01-21 | End: 2025-04-21

## 2025-01-21 NOTE — PROGRESS NOTES
Outpatient Psychiatry      Subjective   Dante Jones, a 34 y.o. male, presents for follow up for medication appointment /outpatient psychiatry appointment for an audio and video virtual appointment , he is an established patient , he was at home for this appointment   He did the appointment with his mother present with his preference for this , whom helps with his care with managing appointments , and he makes his own medical decisions , and she appears supportive , he was at home for this appointment      Virtual or Telephone Consent     An interactive audio and video telecommunication system which permits real time communications between the patient (at the originating site) and provider (at the distant site) was utilized to provide this telehealth service.   Verbal consent was requested and obtained from Dante Jones on this date, 1/21/25 for a telehealth visit.          Diagnosis: ANISH (generalized anxiety disorder)  (F41.1) mild    · Chronic depression (F32.A) stable    · OCD (obsessive compulsive disorder) unspecified type mild F42.9     Treatment Plan/Recommendations:   Follow-up plan was discussed with patient.  Psychotropic medications :  Continue bupropion xl 150 mg , daily for depression   Continue sertraline 100 mg , 1  tablet daily each night for depression and anxiety      Review with patient: Treatment plan reviewed with the patient.  Medication risks/benefit reviewed with the patient     HPI:  he has continued to see his psychologist regularly for individual and also has group therapy   intends to continue therapy   No recent self harm issues, no recent er visits for mental health reasons , no recent psychiatric hospitalizations  He sometimes picks at his skin when he is nervous    reconciled medication list in the [facility] emr and provided psychoeducation and   support     he had a cpap at one point and he was not able to use this , he says he turned the cpap back in       He sees a functional medicine   at [facility] every few months , and and he feels validated that his health concerns are being addressed there , is taking naltrexone compounded formulation   He says he has osteopathic treatment and this has helped  He was reassembling a computer recently , this keeps him busy   He says his energy is low and he is fatigued     Psych ros and medical ros as noted above      Patient Active Problem List   Diagnosis    Attention deficit hyperactivity disorder (ADHD), predominantly inattentive type    Anxiety disorder    Obsessive-compulsive disorder    Chronic depression     Current Outpatient Medications:     ASPIRIN ORAL, Take by mouth., Disp: , Rfl:     buPROPion XL (Wellbutrin XL) 150 mg 24 hr tablet, Take 1 tablet (150 mg) by mouth once daily in the morning. Take before meals., Disp: 90 tablet, Rfl: 0    ciclopirox (Penlac) 8 % solution, Apply topically., Disp: , Rfl:     L-GLUTAMINE ORAL, Take 0.5 tsp by mouth twice a day., Disp: , Rfl:     levocetirizine (Xyzal) 5 mg tablet, Take 1 tablet (5 mg) by mouth once daily., Disp: , Rfl:     multivit-min-ferrous fumarate 15 mg iron tablet, Take by mouth., Disp: , Rfl:     naltrexone HCl (NALTREXONE ORAL), Take 4 mg by mouth once daily. Patient reports taking compounded naltrexone, Disp: , Rfl:     OMEGA-3S-DHA-EPA-FISH OIL ORAL, Take by mouth once daily., Disp: , Rfl:     prednisoLONE acetate (Pred-Forte) 1 % ophthalmic suspension, Administer into affected eye(s)., Disp: , Rfl:     sertraline (Zoloft) 100 mg tablet, Take 1 tablet (100 mg) by mouth once daily. This is a reduced dose and replaces script for 100 mg , 1 and 1/2 tablets daily, Disp: 90 tablet, Rfl: 0  Medical History:  History reviewed. No pertinent past medical history.  Surgical History:  History reviewed. No pertinent surgical history.  Family History:  No family history on file.  Social History:  Social History     Socioeconomic History    Marital status: Single     Spouse name: Not on file     Number of children: Not on file    Years of education: Not on file    Highest education level: Not on file   Occupational History    Not on file   Tobacco Use    Smoking status: Never     Passive exposure: Past    Smokeless tobacco: Never    Tobacco comments:     Past exposure to smoke from a fireplace sensitivity  2013- 2022 reported by patient ; vents cleaned and uses an air filter    Substance and Sexual Activity    Alcohol use: Never    Drug use: Never    Sexual activity: Not on file   Other Topics Concern    Not on file   Social History Narrative    Not on file     Social Drivers of Health     Financial Resource Strain: Low Risk  (9/19/2022)    Received from [facility]    Overall Financial Resource Strain (CARDIA)     Difficulty of Paying Living Expenses: Not hard at all   Food Insecurity: No Food Insecurity (9/19/2022)    [facility]    Hunger Vital Sign     Worried About Running Out of Food in the Last Year: Never true     Ran Out of Food in the Last Year: Never true   Transportation Needs: No Transportation Needs (4/22/2020)    Received [facility]    PRAPARE - Transportation     Lack of Transportation (Medical): No     Lack of Transportation (Non-Medical): No   Physical Activity: Insufficiently Active (4/22/2020)    Received from [facility]    Exercise Vital Sign     Days of Exercise per Week: 3 days     Minutes of Exercise per Session: 10 min   Stress: Stress Concern Present (4/22/2020)    Received from [facility]    [place] of Occupational Health - Occupational Stress Questionnaire     Feeling of Stress : Very much   Social Connections: Unknown (4/22/2020)    Received from [facility]    Social Connection and Isolation Panel [NHANES]     Frequency of Communication with Friends and Family: More than three times a week     Frequency of Social Gatherings with Friends and Family: More than three times a week     Attends Buddhist Services: More than 4 times per year     Active Member of Clubs or Organizations: No     Attends Club or Organization Meetings: Never     Marital Status: Not on file   Intimate Partner Violence: Not on file   Housing Stability: Not on file     Vitals:  There were no vitals filed for this visit.    Laura Byers, APRN-CNS

## 2025-04-15 ENCOUNTER — APPOINTMENT (OUTPATIENT)
Dept: BEHAVIORAL HEALTH | Facility: CLINIC | Age: 35
End: 2025-04-15
Payer: MEDICARE

## 2025-04-15 DIAGNOSIS — F41.1 GAD (GENERALIZED ANXIETY DISORDER): ICD-10-CM

## 2025-04-15 DIAGNOSIS — F42.9 OBSESSIVE-COMPULSIVE DISORDER, UNSPECIFIED TYPE: ICD-10-CM

## 2025-04-15 DIAGNOSIS — F32.A CHRONIC DEPRESSION: ICD-10-CM

## 2025-04-15 PROCEDURE — 99212 OFFICE O/P EST SF 10 MIN: CPT | Performed by: CLINICAL NURSE SPECIALIST

## 2025-04-15 RX ORDER — BUPROPION HYDROCHLORIDE 150 MG/1
150 TABLET ORAL
Qty: 90 TABLET | Refills: 1 | Status: SHIPPED | OUTPATIENT
Start: 2025-04-15 | End: 2025-07-14

## 2025-04-15 RX ORDER — SERTRALINE HYDROCHLORIDE 100 MG/1
100 TABLET, FILM COATED ORAL DAILY
Qty: 90 TABLET | Refills: 1 | Status: SHIPPED | OUTPATIENT
Start: 2025-04-15 | End: 2025-07-14

## 2025-04-15 NOTE — PROGRESS NOTES
"Outpatient Psychiatry      Subjective   Dante Jones, a 35 y.o. male, presents for follow up for medication appointment /outpatient psychiatry appointment for an audio and video virtual appointment , he is an established patient , he was at home for this appointment   He did the appointment with his mother present with his preference for this , whom helps with his care with managing appointments , and he makes his own medical decisions , and she appears supportive , he was at home for this appointment      Virtual or Telephone Consent     An interactive audio and video telecommunication system which permits real time communications between the patient (at the originating site) and provider (at the distant site) was utilized to provide this telehealth service.   Verbal consent was requested and obtained from Dante Jones on this date, 4/15/25 for a telehealth visit.          Diagnosis: ANISH (generalized anxiety disorder)  (F41.1) mild    · Chronic depression (F32.A) stable    · OCD (obsessive compulsive disorder) unspecified type mild F42.9     Treatment Plan/Recommendations:   Follow-up plan was discussed with patient.  Psychotropic medications :  Continue bupropion xl 150 mg , daily for depression   Continue sertraline 100 mg , 1  tablet daily each night for depression and anxiety      Review with patient: Treatment plan reviewed with the patient.  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 , and he has been socializing with others both online and in person , he says he is trying to get his functioning back into place before he can work , he says , he says he can only be up for about an hour before he feels \"tissue compression\"  He is pleased overall with his medications , says he has rumination and realizes this and can manage this a lot easier   he has continued to see his psychologist regularly for individual and also has group therapy and intends " to continue this    No recent self harm issues, no recent er visits for mental health reasons , no recent psychiatric hospitalizations  He sometimes picks at his skin when he is nervous    reconciled medication list in the [facility] emr and provided psychoeducation and   support     he had a cpap at one point and he was not able to use this , he says he turned the cpap back in       He sees a functional medicine dr at [facility] every few months , and and he feels validated that his health concerns are being addressed there , is taking naltrexone compounded formulation   He says he has osteopathic treatment and this has helped  He says his energy is improved some and he says that if he eats eggs then it is not good and he has to lay down , he says he is taking glutamine to support improvement in gut lining      Psych ros and medical ros as noted above      Patient Active Problem List   Diagnosis    Attention deficit hyperactivity disorder (ADHD), predominantly inattentive type    Anxiety disorder    Obsessive-compulsive disorder    Chronic depression     Current Outpatient Medications:     ASPIRIN ORAL, Take by mouth., Disp: , Rfl:     buPROPion XL (Wellbutrin XL) 150 mg 24 hr tablet, Take 1 tablet (150 mg) by mouth once daily in the morning. Take before meals., Disp: 90 tablet, Rfl: 1    ciclopirox (Penlac) 8 % solution, Apply topically., Disp: , Rfl:     L-GLUTAMINE ORAL, Take 0.5 tsp by mouth twice a day., Disp: , Rfl:     levocetirizine (Xyzal) 5 mg tablet, Take 1 tablet (5 mg) by mouth once daily., Disp: , Rfl:     multivit-min-ferrous fumarate 15 mg iron tablet, Take by mouth., Disp: , Rfl:     naltrexone HCl (NALTREXONE ORAL), Take 4 mg by mouth once daily. Patient reports taking compounded naltrexone, Disp: , Rfl:     OMEGA-3S-DHA-EPA-FISH OIL ORAL, Take by mouth once daily., Disp: , Rfl:     sertraline (Zoloft) 100 mg tablet, Take 1 tablet (100 mg) by mouth once daily., Disp: 90 tablet, Rfl: 1  Medical  History:  No past medical history on file.  Surgical History:  No past surgical history on file.  Family History:  No family history on file.  Social History:  Social History     Socioeconomic History    Marital status: Single     Spouse name: Not on file    Number of children: Not on file    Years of education: Not on file    Highest education level: Not on file   Occupational History    Not on file   Tobacco Use    Smoking status: Never     Passive exposure: Past    Smokeless tobacco: Never    Tobacco comments:     Past exposure to smoke from a fireplace sensitivity  2013- 2022 reported by patient ; vents cleaned and uses an air filter    Substance and Sexual Activity    Alcohol use: Never    Drug use: Never    Sexual activity: Not on file   Other Topics Concern    Not on file   Social History Narrative    Not on file     Social Drivers of Health     Financial Resource Strain: Low Risk  (9/19/2022)    [email or web address]    Overall Financial Resource Strain (CARDIA)     Difficulty of Paying Living Expenses: Not hard at all   Food Insecurity: No Food Insecurity (9/19/2022)    Received from [email or web address]    Hunger Vital Sign     Worried About Running Out of Food in the Last Year: Never true     Ran Out of Food in the Last Year: Never true   Transportation Needs: No Transportation Needs (4/22/2020)    Received from [email or web address]    PRAPARE - Transportation     Lack of Transportation (Medical): No     Lack of Transportation (Non-Medical): No   Physical Activity: Insufficiently Active (4/22/2020)    Received from [email or web address]    Exercise Vital Sign     Days of Exercise per Week: 3 days     Minutes of Exercise per Session: 10 min   Stress: Stress Concern Present (4/22/2020)    Received from [facility]  [facility]    [place] [facility] [employer] - Occupational Stress Questionnaire     Feeling of Stress : Very much   Social Connections: Unknown (4/22/2020)    Received from [facility]    Social Connection and Isolation Panel [NHANES]     Frequency of Communication with Friends and Family: More than three times a week     Frequency of Social Gatherings with Friends and Family: More than three times a week     Attends Taoist Services: More than 4 times per year     Active Member of Clubs or Organizations: No     Attends Club or Organization Meetings: Never     Marital Status: Not on file   Intimate Partner Violence: Not on file   Housing Stability: Not on file     Vitals:  There were no vitals filed for this visit.    Laura Byers, APRN-CNS

## 2025-06-09 ENCOUNTER — APPOINTMENT (OUTPATIENT)
Dept: BEHAVIORAL HEALTH | Facility: CLINIC | Age: 35
End: 2025-06-09
Payer: MEDICARE

## 2025-06-09 DIAGNOSIS — F42.9 OBSESSIVE-COMPULSIVE DISORDER, UNSPECIFIED TYPE: ICD-10-CM

## 2025-06-09 DIAGNOSIS — F32.A CHRONIC DEPRESSION: ICD-10-CM

## 2025-06-09 DIAGNOSIS — F41.1 GAD (GENERALIZED ANXIETY DISORDER): ICD-10-CM

## 2025-06-09 PROCEDURE — 99214 OFFICE O/P EST MOD 30 MIN: CPT | Performed by: CLINICAL NURSE SPECIALIST

## 2025-06-09 RX ORDER — BUPROPION HYDROCHLORIDE 300 MG/1
300 TABLET ORAL
Qty: 90 TABLET | Refills: 0 | Status: SHIPPED | OUTPATIENT
Start: 2025-06-09 | End: 2025-06-09

## 2025-06-09 RX ORDER — BUPROPION HYDROCHLORIDE 300 MG/1
300 TABLET ORAL
Qty: 90 TABLET | Refills: 0 | Status: SHIPPED | OUTPATIENT
Start: 2025-06-09 | End: 2025-09-07

## 2025-06-09 NOTE — PROGRESS NOTES
"Outpatient Psychiatry      Subjective   Dante Jones, a 35 y.o. male,   presents for follow up for medication appointment /outpatient psychiatry appointment for an audio and video virtual appointment , he is an established patient , he was at home for this appointment   He did the appointment with his mother present with his preference for this , whom helps with his care with managing appointments , and he makes his own medical decisions , and she appears supportive , he was at home for this appointment      Virtual or Telephone Consent     An interactive audio and video telecommunication system which permits real time communications between the patient (at the originating site) and provider (at the distant site) was utilized to provide this telehealth service.   Verbal consent was requested and obtained from Dante Jones on this date, 6/9/25 for a telehealth visit.        Diagnosis: ANISH (generalized anxiety disorder)  (F41.1) mild    · Chronic depression (F32.A) stable    · OCD (obsessive compulsive disorder) unspecified type mild F42.9     Treatment Plan/Recommendations:   Follow-up plan was discussed with patient.  Psychotropic medications :  Continue and increase bupropion xl 300 mg , daily for depression   Continue sertraline 100 mg , 1  tablet daily each night for depression and anxiety      Review with patient: Treatment plan reviewed with the patient.  Medication risks/benefit reviewed with the patient     HPI:  he feels \"tightness of the fascia \" which he says is causing confusion and dyslexia , he says he has had treatments for myofascial issues in the past , though [facility] osteopathic medicine , and I defer him to discuss this with his medical providers he sees  He says he believes the ocd has accounted for muscle tension over time   He says he tries to do stretching exercises  He says he believes adhd is an issue , and that when he took guanfacine in 2023 he felt good then lowered his blood " pressure , this was not recommended when I was treating him  He denies having depressed mood   he has continued to see his psychologist regularly for individual and also has group therapy and intends to continue this    No recent self harm issues, no recent er visits for mental health reasons , no recent psychiatric hospitalizations    reconciled medication list in the [facility] emr and provided psychoeducation and   support     he had a cpap at one point and he was not able to use this , he says he snores at night , and he could not get used to the cpap       He sees a functional medicine dr at [facility] every few months , and and he feels validated that his health concerns are being addressed there   He has not had a higher dose of bupropion xl in the past , he agrees to increase this for depressed mood and low energy   He does not want to increase sertraline   He says from time to time he has skin picking as an issue      Psych ros and medical ros as noted above      Social History     Socioeconomic History    Marital status: Single     Spouse name: Not on file    Number of children: Not on file    Years of education: Not on file    Highest education level: Not on file   Occupational History    Not on file   Tobacco Use    Smoking status: Never     Passive exposure: Past    Smokeless tobacco: Never    Tobacco comments:     Past exposure to smoke from a fireplace sensitivity  2013- 2022 reported by patient ; vents cleaned and uses an air filter    Substance and Sexual Activity    Alcohol use: Never    Drug use: Never    Sexual activity: Not on file   Other Topics Concern    Not on file   Social History Narrative    Not on file     Social Drivers of Health     Financial Resource Strain: Low Risk  (9/19/2022)    Received from [facility]    Overall Financial Resource Strain (CARDIA)     Difficulty of Paying Living Expenses: Not hard at all   Food Insecurity: No Food Insecurity (9/19/2022)     Received from [facility]    Hunger Vital Sign     Worried About Running Out of Food in the Last Year: Never true     Ran Out of Food in the Last Year: Never true   Transportation Needs: No Transportation Needs (4/22/2020)    Received from [facility]    PRAPARE - Transportation     Lack of Transportation (Medical): No     Lack of Transportation (Non-Medical): No   Physical Activity: Insufficiently Active (4/22/2020)    Received from [facility]    Exercise Vital Sign     Days of Exercise per Week: 3 days     Minutes of Exercise per Session: 10 min   Stress: Stress Concern Present (4/22/2020)    Received from [facility]    [place] [employer] - Occupational Stress Questionnaire     Feeling of Stress : Very much   Social Connections: Unknown (4/22/2020)    Received from [facility]    Social Connection and Isolation Panel [NHANES]     Frequency of Communication with Friends and Family: More than three times a week     Frequency of Social Gatherings with Friends and Family: More than three times a week     Attends Mandaen Services: More than 4 times per year     Active Member of Clubs or Organizations: No     Attends Club or Organization Meetings: Never     Marital Status: Not on file   Intimate Partner Violence: Not on file   Housing Stability: Not on file     Vitals:  There were no vitals filed for this visit.    Laura Byers, APRN-CNS

## 2025-06-10 ENCOUNTER — APPOINTMENT (OUTPATIENT)
Dept: BEHAVIORAL HEALTH | Facility: CLINIC | Age: 35
End: 2025-06-10
Payer: MEDICARE

## 2025-07-10 ENCOUNTER — APPOINTMENT (OUTPATIENT)
Dept: BEHAVIORAL HEALTH | Facility: CLINIC | Age: 35
End: 2025-07-10
Payer: MEDICARE

## 2025-07-10 DIAGNOSIS — F41.1 GAD (GENERALIZED ANXIETY DISORDER): ICD-10-CM

## 2025-07-10 DIAGNOSIS — F42.9 OBSESSIVE-COMPULSIVE DISORDER, UNSPECIFIED TYPE: ICD-10-CM

## 2025-07-10 DIAGNOSIS — F32.A CHRONIC DEPRESSION: ICD-10-CM

## 2025-07-10 PROCEDURE — 99212 OFFICE O/P EST SF 10 MIN: CPT | Performed by: CLINICAL NURSE SPECIALIST

## 2025-07-20 PROBLEM — F41.1 GAD (GENERALIZED ANXIETY DISORDER): Status: ACTIVE | Noted: 2024-09-26

## 2025-09-25 ENCOUNTER — APPOINTMENT (OUTPATIENT)
Dept: BEHAVIORAL HEALTH | Facility: CLINIC | Age: 35
End: 2025-09-25
Payer: MEDICARE